# Patient Record
Sex: MALE | Race: WHITE | NOT HISPANIC OR LATINO | URBAN - METROPOLITAN AREA
[De-identification: names, ages, dates, MRNs, and addresses within clinical notes are randomized per-mention and may not be internally consistent; named-entity substitution may affect disease eponyms.]

---

## 2017-02-07 ENCOUNTER — IMPORTED ENCOUNTER (OUTPATIENT)
Dept: URBAN - METROPOLITAN AREA CLINIC 31 | Facility: CLINIC | Age: 77
End: 2017-02-07

## 2017-02-07 PROBLEM — H25.813: Noted: 2017-02-07

## 2017-02-07 PROBLEM — H40.053: Noted: 2017-02-07

## 2017-02-07 PROCEDURE — 92014 COMPRE OPH EXAM EST PT 1/>: CPT

## 2017-02-07 PROCEDURE — 92015 DETERMINE REFRACTIVE STATE: CPT

## 2017-02-07 NOTE — PATIENT DISCUSSION
1.  Discussed the risks benefits alternatives and limitations of cataract surgery including infection bleeding loss of vision retinal tears detachment. Schedule KPE/IOL OD/OS. Distance ou when desired. Pt will need glasses for reading. 2. Ocular HTN OU:  Elevated intraocular pressure without signs of glaucomatous damage to the optic nerve. Will continue to monitor. IOP normal today nerve normal3. Return for an appointment in 1 year for comprehensive exam. with Dr. Juan J Nieto.

## 2017-03-13 ENCOUNTER — IMPORTED ENCOUNTER (OUTPATIENT)
Dept: URBAN - METROPOLITAN AREA CLINIC 31 | Facility: CLINIC | Age: 77
End: 2017-03-13

## 2017-03-13 PROBLEM — H25.813: Noted: 2017-03-13

## 2017-03-13 PROCEDURE — 76519 ECHO EXAM OF EYE: CPT

## 2017-03-13 NOTE — PATIENT DISCUSSION
Discussed the risks benefits alternatives and limitations of cataract surgery including infection bleeding loss of vision retinal tears detachment. The patient stated a full understanding and a desire to proceed with the procedure in both eyes. Refractive options were reviewed. Patient has elected to be optimized for distance vision in both eyes. The patient will still need glasses for reading and to possibly fine tune distance vision.

## 2017-03-20 ENCOUNTER — IMPORTED ENCOUNTER (OUTPATIENT)
Dept: URBAN - METROPOLITAN AREA CLINIC 31 | Facility: CLINIC | Age: 77
End: 2017-03-20

## 2017-03-20 PROBLEM — H25.813: Noted: 2017-03-20

## 2017-03-20 PROCEDURE — 76519 ECHO EXAM OF EYE: CPT

## 2017-03-20 NOTE — PATIENT DISCUSSION
Discussed the risks benefits alternatives and limitations of cataract surgery including infection bleeding loss of vision retinal tears detachment. The patient stated a full understanding and a desire to proceed with the procedure in both eyes. Refractive options were reviewed. Patient has elected to be optimized for distance vision in both eyes. The patient will still need glasses for reading and to possibly fine tune distance vision.   Dryness improved and K readings better today for IOL calcs

## 2017-03-28 ENCOUNTER — IMPORTED ENCOUNTER (OUTPATIENT)
Dept: URBAN - METROPOLITAN AREA CLINIC 31 | Facility: CLINIC | Age: 77
End: 2017-03-28

## 2017-03-28 PROBLEM — Z96.1: Noted: 2017-03-28

## 2017-03-28 PROCEDURE — 99024 POSTOP FOLLOW-UP VISIT: CPT

## 2017-04-04 ENCOUNTER — IMPORTED ENCOUNTER (OUTPATIENT)
Dept: URBAN - METROPOLITAN AREA CLINIC 31 | Facility: CLINIC | Age: 77
End: 2017-04-04

## 2017-04-04 PROBLEM — Z96.1: Noted: 2017-04-04

## 2017-04-04 PROCEDURE — 99024 POSTOP FOLLOW-UP VISIT: CPT

## 2017-04-04 NOTE — PATIENT DISCUSSION
1.  Post-Op Week #1 - Cataract Surgery Right Eye (OD) - Intraocular lens stable and surgery very well healed. Patient to resume all normal activities. Finish postop drops as directed. Final Refraction given if necessary. 2. Post-Op Day #1 - Cataract Surgery Left Eye (OS) - doing well. Tears prn. Continue postop drops as directed. Call office with symptoms of pain redness or decreased vision in operative eye.  1 drop of zylet instilled3. Return for an appointment in 1 week for post op exam. MRx. with Dr. Marion Guillaume.

## 2017-04-11 ENCOUNTER — IMPORTED ENCOUNTER (OUTPATIENT)
Dept: URBAN - METROPOLITAN AREA CLINIC 31 | Facility: CLINIC | Age: 77
End: 2017-04-11

## 2017-04-11 PROBLEM — Z96.1: Noted: 2017-04-11

## 2017-04-11 PROCEDURE — 99024 POSTOP FOLLOW-UP VISIT: CPT

## 2017-04-11 NOTE — PATIENT DISCUSSION
1.  Post-Op Week #1 - Cataract Surgery Left Eye (OS) -  Intraocular lens stable and surgery very well healed. Patient to resume all normal activities. Finish postop drops as directed. Final Refraction given if necessary. w/ mrx2. Post-Op Week #2 -  Cataract Surgery Right Eye (OD) - Intraocular lens stable and surgery very well healed. Patient to resume all normal activities. Finish postop drops as directed. Final Refraction given if necessary. Frequent tears. 3. Return for an appointment in 1 month for post op exam/MRx. with Dr. Zuleika Silver.

## 2017-05-16 ENCOUNTER — IMPORTED ENCOUNTER (OUTPATIENT)
Dept: URBAN - METROPOLITAN AREA CLINIC 31 | Facility: CLINIC | Age: 77
End: 2017-05-16

## 2017-05-16 PROBLEM — Z96.1: Noted: 2017-05-16

## 2017-05-16 PROCEDURE — 99024 POSTOP FOLLOW-UP VISIT: CPT

## 2017-05-16 NOTE — PATIENT DISCUSSION
1.  Post-Op Cataract Surgery 15-90 days Both Eyes (OU)-  Doing well with stable vision. 2. Return for an appointment in 4 months for dilated fundus exam. with Dr. Eveline Shirley.

## 2017-10-19 ENCOUNTER — IMPORTED ENCOUNTER (OUTPATIENT)
Dept: URBAN - METROPOLITAN AREA CLINIC 31 | Facility: CLINIC | Age: 77
End: 2017-10-19

## 2017-10-19 PROBLEM — Z96.1: Noted: 2017-10-19

## 2017-10-19 PROCEDURE — 92014 COMPRE OPH EXAM EST PT 1/>: CPT

## 2017-10-19 NOTE — PATIENT DISCUSSION
1.  Pseudophakia OU - IOLs stable. Monitor. 2. Return for an appointment in 12 months for comprehensive exam. with Dr. Mandy Paz.

## 2018-12-04 ENCOUNTER — IMPORTED ENCOUNTER (OUTPATIENT)
Dept: URBAN - METROPOLITAN AREA CLINIC 31 | Facility: CLINIC | Age: 78
End: 2018-12-04

## 2018-12-04 PROBLEM — Z96.1: Noted: 2018-12-04

## 2018-12-04 PROBLEM — H40.053: Noted: 2018-12-04

## 2018-12-04 PROBLEM — H26.493: Noted: 2018-12-04

## 2018-12-04 PROCEDURE — 92014 COMPRE OPH EXAM EST PT 1/>: CPT

## 2018-12-04 PROCEDURE — 92015 DETERMINE REFRACTIVE STATE: CPT

## 2018-12-04 NOTE — PATIENT DISCUSSION
1.  PCO  OU (Posterior Capsule Opacification)   PCO is visually significant and impairment of vision does not meet the patient’s functional needs or interferes with activities of daily living. Risks benefits and alternatives to the Nd:YAG Laser reviewed including elevated IOP immediately postop and retinal tear/detachment. Patient to notify their ophthalmologist promptly if they have a significant change in symptoms such as flashes of light (photopsia) an increase in floaters loss of visual field or decrease in visual acuity after the procedure. Patient will be scheduled in [place] [id number] for Nd:YAG Laser. Schedule Yag Caps OD/OS2. Pseudophakia OU - IOLs stable. Monitor. 3.   Ocular HTN OU:  IOP normal since cataract surgery

## 2019-06-21 ENCOUNTER — IMPORTED ENCOUNTER (OUTPATIENT)
Dept: URBAN - METROPOLITAN AREA CLINIC 31 | Facility: CLINIC | Age: 79
End: 2019-06-21

## 2019-06-21 PROBLEM — Z96.1: Noted: 2019-06-21

## 2019-06-21 PROCEDURE — 99214 OFFICE O/P EST MOD 30 MIN: CPT

## 2019-06-21 NOTE — PATIENT DISCUSSION
1.  Pseudophakia OU - IOLs stable. Monitor. h/o laser for tear call with any problems2. Return for an appointment in 12 months for comprehensive exam. with Dr. Donny Hinton.

## 2020-06-23 ENCOUNTER — IMPORTED ENCOUNTER (OUTPATIENT)
Dept: URBAN - METROPOLITAN AREA CLINIC 31 | Facility: CLINIC | Age: 80
End: 2020-06-23

## 2020-06-23 PROBLEM — H31.001: Noted: 2020-06-23

## 2020-06-23 PROBLEM — Z96.1: Noted: 2020-06-23

## 2020-06-23 PROCEDURE — 92014 COMPRE OPH EXAM EST PT 1/>: CPT

## 2020-06-23 PROCEDURE — 92015 DETERMINE REFRACTIVE STATE: CPT

## 2020-06-23 NOTE — PATIENT DISCUSSION
1.  Pseudophakia OU - IOLs stable. Monitor for changes in vision. H/o old laser scars call with problemsReturn for an appointment in 1 year for comprehensive exam. with Dr. Mandy Paz.  2.  CR scar OD will monitor stable after laser for retinal tear

## 2021-06-23 ENCOUNTER — IMPORTED ENCOUNTER (OUTPATIENT)
Dept: URBAN - METROPOLITAN AREA CLINIC 31 | Facility: CLINIC | Age: 81
End: 2021-06-23

## 2021-06-23 PROCEDURE — 92014 COMPRE OPH EXAM EST PT 1/>: CPT

## 2022-04-02 ASSESSMENT — VISUAL ACUITY
OD_CC: 20/40+1
OD_PH: SC 20/50 +1
OD_SC: 20/20-2
OD_PH: SC 20/25 -3
OS_CC: 20/30
OD_CC: 20/30
OS_SC: 20/20-2
OS_PH: SC 20/50
OD_SC: 20/25
OS_GLARE: 20/50
OS_CC: J1+
OD_CC: 20/40
OS_SC: 20/20
OD_CC: 20/40+2
OU_SC: 20/100
OD_CC: J2-2
OS_CC: 20/25-2
OD_SC: 20/30+1
OS_CC: 20/60+1
OS_SC: 20/20
OD_SC: 20/60+1
OS_CC: 20/25
OU_CC: J1+
OD_SC: 20/20
OS_SC: 20/20
OD_CC: 20/40-2
OS_GLARE: 20/40
OS_SC: 20/25-2
OD_PH: SC 20/30 +2
OD_GLARE: 20/200
OD_GLARE: 20/70
OS_CC: 20/25

## 2022-04-02 ASSESSMENT — TONOMETRY
OS_IOP_MMHG: 18
OD_IOP_MMHG: 15
OD_IOP_MMHG: 17
OD_IOP_MMHG: 17
OD_IOP_MMHG: 21
OD_IOP_MMHG: 15
OS_IOP_MMHG: 13
OS_IOP_MMHG: 20
OS_IOP_MMHG: 18
OS_IOP_MMHG: 17
OD_IOP_MMHG: 19
OS_IOP_MMHG: 16
OS_IOP_MMHG: 12
OD_IOP_MMHG: 17
OS_IOP_MMHG: 17
OD_IOP_MMHG: 13
OD_IOP_MMHG: 27

## 2022-07-12 ENCOUNTER — PREPPED CHART (OUTPATIENT)
Dept: URBAN - METROPOLITAN AREA CLINIC 29 | Facility: CLINIC | Age: 82
End: 2022-07-12

## 2022-07-14 ENCOUNTER — COMPREHENSIVE EXAM (OUTPATIENT)
Dept: URBAN - METROPOLITAN AREA CLINIC 29 | Facility: CLINIC | Age: 82
End: 2022-07-14

## 2022-07-14 DIAGNOSIS — H33.301: ICD-10-CM

## 2022-07-14 PROCEDURE — 92014 COMPRE OPH EXAM EST PT 1/>: CPT

## 2022-07-14 ASSESSMENT — VISUAL ACUITY
OD_CC: 20/25
OS_CC: 20/20
OS_CC: 20/20
OD_CC: 20/25

## 2022-07-14 ASSESSMENT — TONOMETRY
OD_IOP_MMHG: 15
OS_IOP_MMHG: 15

## 2023-08-15 ENCOUNTER — COMPREHENSIVE EXAM (OUTPATIENT)
Dept: URBAN - METROPOLITAN AREA CLINIC 29 | Facility: CLINIC | Age: 83
End: 2023-08-15

## 2023-08-15 DIAGNOSIS — Z96.1: ICD-10-CM

## 2023-08-15 DIAGNOSIS — H33.301: ICD-10-CM

## 2023-08-15 DIAGNOSIS — H04.123: ICD-10-CM

## 2023-08-15 PROCEDURE — 92014 COMPRE OPH EXAM EST PT 1/>: CPT

## 2023-08-15 ASSESSMENT — VISUAL ACUITY
OD_CC: 20/30-2
OS_CC: 20/30-2
OU_CC: 20/25

## 2023-08-15 ASSESSMENT — TONOMETRY
OS_IOP_MMHG: 13
OD_IOP_MMHG: 13

## 2024-08-14 ENCOUNTER — COMPREHENSIVE EXAM (OUTPATIENT)
Dept: URBAN - METROPOLITAN AREA CLINIC 29 | Facility: CLINIC | Age: 84
End: 2024-08-14

## 2024-08-14 DIAGNOSIS — H33.301: ICD-10-CM

## 2024-08-14 DIAGNOSIS — Z96.1: ICD-10-CM

## 2024-08-14 DIAGNOSIS — H04.123: ICD-10-CM

## 2024-08-14 PROCEDURE — 99214 OFFICE O/P EST MOD 30 MIN: CPT

## 2024-08-14 ASSESSMENT — VISUAL ACUITY
OS_CC: 20/30
OD_CC: 20/30

## 2024-08-14 ASSESSMENT — TONOMETRY
OD_IOP_MMHG: 15
OS_IOP_MMHG: 15

## 2025-08-26 ENCOUNTER — COMPREHENSIVE EXAM (OUTPATIENT)
Age: 85
End: 2025-08-26

## 2025-08-26 DIAGNOSIS — H33.301: ICD-10-CM

## 2025-08-26 DIAGNOSIS — Z98.890: ICD-10-CM

## 2025-08-26 DIAGNOSIS — Z96.1: ICD-10-CM

## 2025-08-26 DIAGNOSIS — H18.593: ICD-10-CM

## 2025-08-26 PROCEDURE — 92014 COMPRE OPH EXAM EST PT 1/>: CPT
